# Patient Record
Sex: MALE | NOT HISPANIC OR LATINO | URBAN - METROPOLITAN AREA
[De-identification: names, ages, dates, MRNs, and addresses within clinical notes are randomized per-mention and may not be internally consistent; named-entity substitution may affect disease eponyms.]

---

## 2022-02-21 ENCOUNTER — TELEPHONE ENCOUNTER (OUTPATIENT)
Dept: URBAN - METROPOLITAN AREA CLINIC 23 | Facility: CLINIC | Age: 57
End: 2022-02-21

## 2023-10-24 ENCOUNTER — DASHBOARD ENCOUNTERS (OUTPATIENT)
Age: 58
End: 2023-10-24

## 2023-10-24 ENCOUNTER — TELEPHONE ENCOUNTER (OUTPATIENT)
Dept: URBAN - NONMETROPOLITAN AREA CLINIC 2 | Facility: CLINIC | Age: 58
End: 2023-10-24

## 2023-10-24 ENCOUNTER — LAB OUTSIDE AN ENCOUNTER (OUTPATIENT)
Dept: URBAN - NONMETROPOLITAN AREA CLINIC 13 | Facility: CLINIC | Age: 58
End: 2023-10-24

## 2023-10-24 ENCOUNTER — LAB OUTSIDE AN ENCOUNTER (OUTPATIENT)
Dept: URBAN - NONMETROPOLITAN AREA CLINIC 2 | Facility: CLINIC | Age: 58
End: 2023-10-24

## 2023-10-24 ENCOUNTER — OFFICE VISIT (OUTPATIENT)
Dept: URBAN - NONMETROPOLITAN AREA CLINIC 13 | Facility: CLINIC | Age: 58
End: 2023-10-24
Payer: COMMERCIAL

## 2023-10-24 VITALS
HEIGHT: 75 IN | SYSTOLIC BLOOD PRESSURE: 115 MMHG | HEART RATE: 86 BPM | WEIGHT: 165 LBS | BODY MASS INDEX: 20.51 KG/M2 | DIASTOLIC BLOOD PRESSURE: 80 MMHG

## 2023-10-24 DIAGNOSIS — K21.9 GERD WITHOUT ESOPHAGITIS: ICD-10-CM

## 2023-10-24 DIAGNOSIS — R19.5 DARK STOOLS: ICD-10-CM

## 2023-10-24 DIAGNOSIS — Z12.11 COLON CANCER SCREENING: ICD-10-CM

## 2023-10-24 DIAGNOSIS — R14.0 ABDOMINAL DISTENTION: ICD-10-CM

## 2023-10-24 PROBLEM — 266435005: Status: ACTIVE | Noted: 2023-10-24

## 2023-10-24 LAB
A/G RATIO: 1.2
ALBUMIN: 3.5
ALKALINE PHOSPHATASE: 96
ALT (SGPT): 28
ANION GAP: 13
AST (SGOT): 48
BASOPHILS AUTOMATED ABSOLUTE COUNT: 0
BASOPHILS RELATIVE PERCENT: 0.6
BILIRUBIN TOTAL: 0.6
BLOOD UREA NITROGEN: 13
BUN / CREAT RATIO: 16
CALCIUM: 8.9
CHLORIDE: 105
CO2: 26
CREATININE, SERUM: 0.79
EGFR (CKD-EPI): >60
EOSINOPHILS AUTOMATED ABSOLUTE COUNT: 0.1
EOSINOPHILS RELATIVE PERCENT: 1.2
GLUCOSE: 104
HEMATOCRIT: 36.8
HEMOGLOBIN: 11.2
IRON SATURATION: (no result)
IRON: 28
LYMPHOCYTES AUTOMATED ABSOLUTE COUNT: 2.5
LYMPHOCYTES RELATIVE PERCENT: 36.8
MEAN CORPUSCULAR HEMOGLOBIN CONC: 30.5
MEAN CORPUSCULAR HEMOGLOBIN: 22.4
MEAN CORPUSCULAR VOLUME: 73.2
MEAN PLATELET VOLUME: 10
MONOCYTES AUTOMATED ABSOLUTE COUNT: 0.5
MONOCYTES RELATIVE PERCENT: 7.2
NEUTROPHILS AUTOMATED ABSOLUTE: 3.7
NEUTROPHILS RELATIVE PERCENT: 54.2
PLATELET COUNT: 172
POTASSIUM: 4.3
PROTEIN TOTAL: 6.4
RED BLOOD CELL COUNT: 5.02
RED CELL DISTRIBUTION WIDTH: 18.7
SODIUM: 140
TOTAL IRON BINDING CAPACITY: (no result)
UNSATURATED IRON BINDING CAPACITY: >450
WHITE BLOOD CELL COUNT: 6.7

## 2023-10-24 PROCEDURE — 99204 OFFICE O/P NEW MOD 45 MIN: CPT | Performed by: NURSE PRACTITIONER

## 2023-10-24 RX ORDER — LEVOTHYROXINE SODIUM 25 UG/1
TAKE ONE TABLET BY MOUTH EVERY MORNING ON AN EMPTY STOMACH TABLET ORAL
Qty: 30 UNSPECIFIED | Refills: 7 | Status: ACTIVE | COMMUNITY

## 2023-10-24 RX ORDER — PREGABALIN 150 MG/1
TAKE ONE CAPSULE BY MOUTH TWICE A DAY . MAXIMUM DAILY AMOUNT IS 300 MG CAPSULE ORAL
Qty: 60 UNSPECIFIED | Refills: 1 | Status: ACTIVE | COMMUNITY

## 2023-10-24 RX ORDER — FLUOXETINE 10 MG/1
TAKE ONE CAPSULE BY MOUTH ONE TIME DAILY WITH THE 20MG CAPSULE TO MAKE 30MG CAPSULE ORAL
Qty: 30 UNSPECIFIED | Refills: 6 | Status: ACTIVE | COMMUNITY

## 2023-10-24 RX ORDER — RANOLAZINE 500 MG/1
TAKE ONE TABLET BY MOUTH TWICE A DAY TABLET, FILM COATED, EXTENDED RELEASE ORAL
Qty: 30 UNSPECIFIED | Refills: 0 | Status: ACTIVE | COMMUNITY

## 2023-10-24 RX ORDER — RIVAROXABAN 20 MG/1
TAKE 1 TABLET (20 MG) BY ORAL ROUTE ONCE DAILY WITH THE EVENING MEAL TABLET, FILM COATED ORAL 1
Qty: 0 | Refills: 0 | Status: ACTIVE | COMMUNITY
Start: 1900-01-01

## 2023-10-24 RX ORDER — FAMOTIDINE 20 MG/1
1 TABLET AT BEDTIME AS NEEDED TABLET, FILM COATED ORAL ONCE A DAY
Qty: 30 | Refills: 11 | OUTPATIENT
Start: 2023-10-24

## 2023-10-24 RX ORDER — CETIRIZINE HYDROCHLORIDE 10 MG/1
TAKE ONE TABLET BY MOUTH ONE TIME DAILY TABLET ORAL
Qty: 30 UNSPECIFIED | Refills: 9 | Status: ACTIVE | COMMUNITY

## 2023-10-24 RX ORDER — ASPIRIN 81 MG/1
TAKE 1 TABLET (81 MG) BY ORAL ROUTE ONCE DAILY TABLET, COATED ORAL 1
Qty: 0 | Refills: 0 | Status: ACTIVE | COMMUNITY
Start: 1900-01-01

## 2023-10-24 RX ORDER — FLUOXETINE HYDROCHLORIDE 20 MG/1
TAKE ONE CAPSULE BY MOUTH ONE TIME DAILY CAPSULE ORAL
Qty: 30 UNSPECIFIED | Refills: 9 | Status: ACTIVE | COMMUNITY

## 2023-10-24 RX ORDER — METRONIDAZOLE 250 MG/1
TAKE 1 TABLET (250 MG) BY ORAL ROUTE EVERY 8 HOURS TABLET, FILM COATED ORAL
Qty: 30 | Refills: 0 | Status: ACTIVE | COMMUNITY
Start: 2020-04-30

## 2023-10-24 RX ORDER — LORAZEPAM 0.5 MG/1
TAKE 1 TABLET (0.5 MG) BY ORAL ROUTE 3 TIMES PER DAY TABLET ORAL
Qty: 0 | Refills: 0 | Status: ACTIVE | COMMUNITY
Start: 1900-01-01

## 2023-10-24 RX ORDER — LEVETIRACETAM 750 MG/1
TAKE ONE TABLET BY MOUTH TWICE A DAY TABLET, FILM COATED ORAL
Qty: 48 UNSPECIFIED | Refills: 7 | Status: ACTIVE | COMMUNITY

## 2023-10-24 RX ORDER — OXYCODONE HYDROCHLORIDE 5 MG/1
TAKE 1 TABLET (5 MG) BY ORAL ROUTE EVERY 6 HOURS TABLET ORAL
Qty: 0 | Refills: 0 | Status: ACTIVE | COMMUNITY
Start: 1900-01-01

## 2023-10-24 RX ORDER — CHLORPROMAZINE HYDROCHLORIDE 25 MG/1
TAKE 1 TABLET (25 MG) BY ORAL ROUTE 3 TIMES PER DAY TABLET, FILM COATED ORAL
Qty: 30 | Refills: 5 | Status: ACTIVE | COMMUNITY
Start: 2020-05-28

## 2023-10-24 RX ORDER — LEVETIRACETAM 750 MG/1
TAKE 1 TABLET (750 MG) BY ORAL ROUTE 2 TIMES PER DAY TABLET, FILM COATED ORAL 2
Qty: 0 | Refills: 0 | Status: ACTIVE | COMMUNITY
Start: 1900-01-01

## 2023-10-24 RX ORDER — OMEPRAZOLE 20 MG/1
TAKE 1 TABLET BY ORAL ROUTE 2 TIMES A DAY TABLET, DELAYED RELEASE ORAL 2
Qty: 0 | Refills: 0 | Status: ACTIVE | COMMUNITY
Start: 1900-01-01

## 2023-10-24 RX ORDER — OXYCODONE HYDROCHLORIDE 10 MG/1
TAKE ONE TABLET BY MOUTH EVERY 6 HOURS AS NEEDED TABLET ORAL
Qty: 120 UNSPECIFIED | Refills: 0 | Status: ACTIVE | COMMUNITY

## 2023-10-24 NOTE — HPI-TODAY'S VISIT:
10/24/2023 Mr. Ravi Wilkinson is a 58 year old male here for blood in stool. He was last seen in 2020 by Dr Earl. He had a normal EGD and colonoscopy was incomplete due to poor prep. Today, he reports looser stool and darker stool. He has been getting weaker as well. He has trouble swallowing but this is not new and has to eat soft foods. He has been having some reflux recently and loss of appetite. He denies any abdominal pain but his abdomen has been more distended. He is not very active and lays in bed a lot. His PCP did a stool test and it was positive for blood. CS

## 2023-10-24 NOTE — HPI-OTHER HISTORIES
5/18/2020 Pt returns for evaluation of dysphagia and for colon cancer screening. He has seen about 18 months ago for diarhea, RUQ pain and dysphagia. EGD/colon were scheduled but never done due to intervening cardiac issues. US of the RUQ showed gall stones and stool specimen showed Blastocystis. Dysphagia was felt to be due to CVA. There is also a past history of HCV that was treated with "shots and pills". SVR status unclear. The diarrhea has resolved. He was not treated for the Blastocystis. Currently, BMs are irregular. He can go a day or so with no BM and then have several soft BMs. There is no bleeding or melena. Stools do have a bad odor and he has a great deal of gas. There is some mild RLQ pain but no fever or chills. He has a good appetite and wt is stable. He has no major issue with dysphagia. There is no choking or strangling with eating. He has not had pneumonia. The gall stones are asx. He is on Brilinta/Xarelto/ASA for PVD/CAD. The Brilinta is being held for the next 4 weeks. This was a FaceTime call using audiovisual input. Total time 12:42. History is provided by pt's sister--Dawn--due to the pt's aphasia.

## 2024-01-09 ENCOUNTER — TELEPHONE ENCOUNTER (OUTPATIENT)
Dept: URBAN - NONMETROPOLITAN AREA CLINIC 2 | Facility: CLINIC | Age: 59
End: 2024-01-09

## 2024-01-19 ENCOUNTER — OFFICE VISIT (OUTPATIENT)
Dept: URBAN - METROPOLITAN AREA MEDICAL CENTER 1 | Facility: MEDICAL CENTER | Age: 59
End: 2024-01-19